# Patient Record
Sex: MALE | URBAN - METROPOLITAN AREA
[De-identification: names, ages, dates, MRNs, and addresses within clinical notes are randomized per-mention and may not be internally consistent; named-entity substitution may affect disease eponyms.]

---

## 2024-07-08 ENCOUNTER — APPOINTMENT (RX ONLY)
Dept: URBAN - METROPOLITAN AREA CLINIC 73 | Facility: CLINIC | Age: 39
Setting detail: DERMATOLOGY
End: 2024-07-08

## 2024-07-08 DIAGNOSIS — D22 MELANOCYTIC NEVI: ICD-10-CM

## 2024-07-08 DIAGNOSIS — L40.0 PSORIASIS VULGARIS: ICD-10-CM

## 2024-07-08 PROBLEM — D22.71 MELANOCYTIC NEVI OF RIGHT LOWER LIMB, INCLUDING HIP: Status: ACTIVE | Noted: 2024-07-08

## 2024-07-08 PROBLEM — L30.9 DERMATITIS, UNSPECIFIED: Status: ACTIVE | Noted: 2024-07-08

## 2024-07-08 PROCEDURE — ? PATIENT SPECIFIC COUNSELING

## 2024-07-08 PROCEDURE — ? PHOTO-DOCUMENTATION

## 2024-07-08 PROCEDURE — 99203 OFFICE O/P NEW LOW 30 MIN: CPT

## 2024-07-08 PROCEDURE — ? TREATMENT REGIMEN

## 2024-07-08 PROCEDURE — ? COUNSELING

## 2024-07-08 PROCEDURE — ? PRESCRIPTION

## 2024-07-08 RX ORDER — TRIAMCINOLONE ACETONIDE 1 MG/G
CREAM TOPICAL
Qty: 80 | Refills: 0 | Status: ERX | COMMUNITY
Start: 2024-07-08

## 2024-07-08 RX ADMIN — TRIAMCINOLONE ACETONIDE: 1 CREAM TOPICAL at 00:00

## 2024-07-08 ASSESSMENT — LOCATION DETAILED DESCRIPTION DERM
LOCATION DETAILED: LEFT RADIAL DORSAL HAND
LOCATION DETAILED: LEFT KNEE
LOCATION DETAILED: RIGHT KNEE
LOCATION DETAILED: RIGHT PLANTAR FOREFOOT OVERLYING 2ND METATARSAL
LOCATION DETAILED: RIGHT ELBOW
LOCATION DETAILED: LEFT ELBOW
LOCATION DETAILED: RIGHT LATERAL PLANTAR FOOT

## 2024-07-08 ASSESSMENT — LOCATION SIMPLE DESCRIPTION DERM
LOCATION SIMPLE: LEFT KNEE
LOCATION SIMPLE: RIGHT FOOT
LOCATION SIMPLE: RIGHT PLANTAR SURFACE
LOCATION SIMPLE: LEFT HAND
LOCATION SIMPLE: LEFT ELBOW
LOCATION SIMPLE: RIGHT ELBOW
LOCATION SIMPLE: RIGHT KNEE

## 2024-07-08 ASSESSMENT — LOCATION ZONE DERM
LOCATION ZONE: HAND
LOCATION ZONE: ARM
LOCATION ZONE: FEET
LOCATION ZONE: LEG

## 2024-07-08 NOTE — PROCEDURE: TREATMENT REGIMEN
Initiate Regimen: TAC 0.1% apply to affected areas on body bid x 2 weeks
Action 4: Continue
Detail Level: Zone

## 2024-07-08 NOTE — PROCEDURE: PATIENT SPECIFIC COUNSELING
- tx options: topical steroid \\n- disc creams should help calm down flares\\n- reviewed ddx psoriasis vs eczema\\n- pt opted for topical steroid and can continue aquaphor
Detail Level: Detailed

## 2024-07-08 NOTE — HPI: SKIN LESION
Is This A New Presentation, Or A Follow-Up?: Skin Lesions
What Type Of Note Output Would You Prefer (Optional)?: Standard Output
How Severe Is Your Skin Lesion?: moderate
Has Your Skin Lesion Been Treated?: not been treated
Additional History: The pt states that he has tried different otc ointments Aquaphor is the only one that he has seen the slightest improvement with.